# Patient Record
Sex: MALE | Race: WHITE
[De-identification: names, ages, dates, MRNs, and addresses within clinical notes are randomized per-mention and may not be internally consistent; named-entity substitution may affect disease eponyms.]

---

## 2021-10-15 ENCOUNTER — HOSPITAL ENCOUNTER (OUTPATIENT)
Dept: HOSPITAL 46 - ED | Age: 31
Setting detail: OBSERVATION
LOS: 1 days | Discharge: HOME | End: 2021-10-16
Attending: SURGERY | Admitting: SURGERY
Payer: SELF-PAY

## 2021-10-15 VITALS — BODY MASS INDEX: 34.69 KG/M2 | HEIGHT: 66 IN | WEIGHT: 215.83 LBS

## 2021-10-15 DIAGNOSIS — Z20.822: ICD-10-CM

## 2021-10-15 DIAGNOSIS — K35.80: Primary | ICD-10-CM

## 2021-10-15 DIAGNOSIS — F17.210: ICD-10-CM

## 2021-10-15 PROCEDURE — C9803 HOPD COVID-19 SPEC COLLECT: HCPCS

## 2021-10-15 PROCEDURE — U0003 INFECTIOUS AGENT DETECTION BY NUCLEIC ACID (DNA OR RNA); SEVERE ACUTE RESPIRATORY SYNDROME CORONAVIRUS 2 (SARS-COV-2) (CORONAVIRUS DISEASE [COVID-19]), AMPLIFIED PROBE TECHNIQUE, MAKING USE OF HIGH THROUGHPUT TECHNOLOGIES AS DESCRIBED BY CMS-2020-01-R: HCPCS

## 2021-10-15 PROCEDURE — C9113 INJ PANTOPRAZOLE SODIUM, VIA: HCPCS

## 2021-10-15 NOTE — NUR
PT ARRIVED TO [facility] FLOOR AT 2305, PT MOVED OVER TO BED SBA. URINAL AT
BEDSIDE AND ORIENTED PT ON HOW TO USE CALL LIGHT. COMPLETED ADMISSION HX. PT
STATES HE HAD A HARD TIME WAKING FROM ANESTHESIA AS A CHILD FOR AN UNKNOWN
SURGERY. PT REPORTS PAIN 7/10 STATING THE LAST DOSE DIDN'T WORK. TALKED WITH
PRIMARY RN TANYA ABOUT GETTING HIM MORE MORPHINE. CALL LIGHT IS CLSOE AND
PT DENIES NEEDS.

## 2021-10-16 PROCEDURE — 0DTJ4ZZ RESECTION OF APPENDIX, PERCUTANEOUS ENDOSCOPIC APPROACH: ICD-10-PCS | Performed by: SURGERY

## 2021-10-16 NOTE — NUR
REPORT RECEIVED FROM BREANA MARTÍNEZ. PT RESTING IN BED PLAYING ON PHONE. PT
REPORTS 5/10 PAIN IN LOWER ABDOMEN, REPORTS PAIN IS TOLERABLE AND DENIES NEED
FOR PAIN MEDICAITON AT THIS TIME. PUMP ALARMING, "INFUSION COMPLETE"
NEW IV FLUIDS HUNG. PT DENIES ADDITIONAL REQUESTS OR COMPLAINTS AT THIS TIME
STATING HE WOULD LIKE TO REST AS HE DID NOT SLEEP LAST NIGHT. CALL LIGHT
WITHIN REACH. BED RAILS UP.

## 2021-10-16 NOTE — CONS
[facility]
                                    [address], [place]  [address]
_________________________________________________________________________________________
                                                                 Signed   
 
 
DATE OF CONSULTATION:
10/16/2021
 
CHIEF COMPLAINT:
Right lower quadrant abdominal pain.
 
HISTORY OF PRESENT ILLNESS:
Rojelio is a 31-year-old gentleman, otherwise healthy, who developed right lower
quadrant abdominal pain around lunchtime yesterday.  He vomited a couple of times.  He
came to the emergency room for evaluation.  Vital signs were fine.  He was tender in the
right lower quadrant with an elevated white blood cell count.  CT scan confirmed his
fluid-filled appendix with some mild periappendiceal inflammation.  I was asked to admit
him overnight as a general surgeon on-call.  He has received his Rocephin and Flagyl.
He has been hydrated.  He said this morning, he feels about the same, but does feel
better with IV fluids. 
 
PAST MEDICAL HISTORY:
None.
 
PAST SURGICAL HISTORY:
Circumcision, age 8.
 
SOCIAL HISTORY:
He smokes a pack of cigarettes a day.  He does have a drink once in a while.  He has a
girlfriend and one daughter.  His mother is Kate Baer at [phone].  He has no
primary care provider.  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
and so forth. 
 
FAMILY HISTORY:
None.
 
REVIEW OF SYSTEMS:
He said he was slow to wake up after his circumcision.
 
ALLERGIES:
None.
 
MEDICATIONS:
None.
 
PHYSICAL EXAMINATION:
VITAL SIGNS:  His blood pressure is 112/46, heart rate 67, his respiratory rate 16,
temperature is 98.0.  He is 99% on room air.  He is 5 feet 6 inches at 97 kg. 
GENERAL:  Rojelio is a 31-year-old gentleman, who appears healthy and at his stated
 
    Electronically Signed By: CINDY INGRAM MD  10/16/21 1040
_________________________________________________________________________________________
PATIENT NAME:     ROJELIO CROUCH                       
MEDICAL RECORD #: [id number]            CONSULTATION                  
          ACCT #: [id number]  
DATE OF BIRTH:   [birth year]            REPORT #: [id number]      
PHYSICIAN:        CINDY INGRAM MD             
PCP:              NO PRIMARY CARE PHYSICIAN     
REPORT IS CONFIDENTIAL AND NOT TO BE RELEASED WITHOUT AUTHORIZATION
 
 
                                  [facility]
                                    [address], [place]  [address]
_________________________________________________________________________________________
                                                                 Signed   
 
 
age.  He does not appear systemically ill or toxic. 
LUNGS:  Clear to auscultation bilaterally.   
HEART:  Regular rate and rhythm without murmurs.   
ABDOMEN:  Soft and flat, but he is tender just lateral to McBurney's point in the right
lower quadrant and up towards the right middle quadrant. 
RECTAL:  Exam is not performed today.
 
LABORATORY DATA:
His white blood count is 15.9, hemoglobin 14, and neutrophils 83.  Electrolytes
unremarkable.  COVID is negative.  Liver function tests are negative.  His albumin is
4.3. 
 
RADIOGRAPHIC STUDIES:
The CT scan of the abdomen and pelvis are reviewed along with the report.  One can
easily see his fluid-filled appendix with some mild periappendiceal inflammation
starting at the cecum traveling cephalad up along the right gutter. 
 
ASSESSMENT AND PLAN:
Rojelio is a 31-year-old gentleman, who presents with acute appendicitis.  He has been
admitted, started on IV fluids, antibiotics and pain control.  I reviewed with him the
above findings.  We reviewed the location of function of the appendix.  We have reviewed
laparoscopic versus open appendectomy he understands expected intraop and postop course.
 There is risk including, but not limited to bleeding, infection, scarring, change in
contour of the skin, damage to bowel, appendiceal stump leak, postoperative
intra-abdominal abscess, incisional hernias and other unforeseen comorbidities.  He has
expressed understanding would like to proceed. 
 
 
 
            ________________________________________
            Cindy Ingram MD 
 
 
ALB/MODL
Job #:  [id number]
DD:  10/16/2021 08:06:29
DT:  10/16/2021 08:21:24
 
 
Copies:                                
~
 
 
 
 
    Electronically Signed By: CINDY INGRAM MD  10/16/21 1040
_________________________________________________________________________________________
PATIENT NAME:     ROJELIO CROUCH                       
MEDICAL RECORD #: [id number]            CONSULTATION                  
          ACCT #: [id number]  
DATE OF BIRTH:   [birth year]            REPORT #: [id number]      
PHYSICIAN:        CINDY INGRAM MD             
PCP:              NO PRIMARY CARE PHYSICIAN     
REPORT IS CONFIDENTIAL AND NOT TO BE RELEASED WITHOUT AUTHORIZATION

## 2021-10-16 NOTE — NUR
pt AWAKE AND RESTING IN BED, EYES OPEN. RR EVEN AND UNLABORED. NO DISTRESS OR
NEEDS VERBALIZED. pt DENIES NEED FOR PAIN MEDICATION. CALL LIGHT IN REACH.

## 2021-10-16 NOTE — OR
[facility]
                                    [address], [place]  [address]
_________________________________________________________________________________________
                                                                 Signed   
 
 
DATE OF OPERATION:
10/16/2021
 
SURGEON:
Cindy Ingram MD
 
PREOPERATIVE DIAGNOSIS:
Acute appendicitis.
 
POSTOPERATIVE DIAGNOSIS:
Acute inflamed appendicitis.
 
PROCEDURE:
Laparoscopic appendectomy.
 
ESTIMATED BLOOD LOSS:
None.
 
FINDINGS:
Rojelio indeed had acute inflamed appendicitis.  He had a few adhesions of the terminal
ileum into the cecum to the right lateral abdominal wall.  A few of those adhesions on
the terminal ileum were taken down in order to access the base of the appendix. 
 
INDICATIONS:
Rojelio is a 31-year-old gentleman, otherwise quite healthy.  Yesterday, he developed
right lower quadrant abdominal pain with vomiting.  He came to the emergency room for
evaluation.  He was tender in the right lower quadrant.  His white count was elevated.
CT scan confirmed his fluid-filled appendix with some mild periappendiceal inflammation.
 I have been asked to admit him last night as a general surgeon on-call.  He has been
hydrated overnight and started on his antibiotics and pain control.  I met with Rojelio
this morning and I reviewed the above findings with him.  We discussed the location and
function of the appendix.  We discussed laparoscopic versus open appendectomy.  We
reviewed the expected intraop and postop course.  There is risk including, but not
limited to bleeding, infection, scarring, change in contour of the skin, damage to
bowel, appendiceal stump leak, postoperative intraabdominal abscess, incisional hernias
and other unforeseen comorbidities.  He had expressed understanding and wished to
proceed. 
 
PROCEDURE IN DETAIL:
Rojelio was taken into the operating room and placed in the supine position under
general endotracheal tube anesthesia.  He was already on preoperative antibiotics.  He
was given subcutaneous Lovenox.  SCDs were utilized.  A Fitzgerald catheter was inserted with
 
    Electronically Signed By: CINDY INGRAM MD  10/16/21 1814
_________________________________________________________________________________________
PATIENT NAME:     ROJELIO CROUCH                       
MEDICAL RECORD #: [id number]            OPERATIVE REPORT              
          ACCT #: [id number]  
DATE OF BIRTH:   [birth year]            REPORT #: [id number]      
PHYSICIAN:        CINDY INGRAM MD             
PCP:              NO PRIMARY CARE PHYSICIAN     
REPORT IS CONFIDENTIAL AND NOT TO BE RELEASED WITHOUT AUTHORIZATION
 
 
                                  [facility]
                                    [address], [place]  [address]
_________________________________________________________________________________________
                                                                 Signed   
 
 
return of clear yellow urine.  He was then prepped and draped in the usual sterile
fashion.  We placed our trocars in our usual positions under direct visualization of
camera without difficulty.  He had just a few adhesions of the terminal ileum and the
cecum to the right lower quadrant abdominal wall.  We took down just a few adhesions
along the terminal ileum, so we could move the terminal ileum out of the way and access
the base of the appendix.  The base of the appendix was cleared off with cautery and the
mesoappendix was divided with the help of the linear stapler.  We then divided the base
of the appendix from the cecum with a linear stapler.  Hemostasis was excellent in both
staple lines.  The appendix was then placed into an EndoCatch bag and taken out through
the right subcostal trocar site.  We then used our laparoscopic suturing device to pass
0-Vicryl suture x2 on either side of the fascia of the right subcostal trocar site.
This suture was tied down to close this fascia primarily.  After this, all the gas was
allowed to escape and all the trocars were then removed.  We then closed the fascia of
the supraumbilical trocar site with interrupted figure-of-eight and simple 0-Vicryl
sutures.  Local anesthetic was injected into all trocar sites.  Each trocar site was
irrigated and suctioned out until clear.  The dermis of each trocar site was closed with
interrupted 3-0 subcuticular Monocryl sutures.  We used a 5-0 fast absorbing plain gut
suture to close the skin on all three trocar sites.  Dry gauze and tape were applied to
all three incisions.  Rojelio's Fitzgerald catheter was removed without difficulty.  He was
awakened from his anesthesia, extubated in the OR, and taken to recovery room in stable
condition. 
 
 
 
            ________________________________________
            Cindy Ingram MD 
 
 
ALB/MODL
Job #:  [id number]
DD:  10/16/2021 10:35:03
DT:  10/16/2021 17:09:16
 
cc:            Cindy Ingram MD
 
 
Copies:  CINDY INGRAM MD
~
 
 
 
 
 
    Electronically Signed By: CINDY INGRAM MD  10/16/21 1814
_________________________________________________________________________________________
PATIENT NAME:     ROJELIO CROUCH                       
MEDICAL RECORD #: [id number]            OPERATIVE REPORT              
          ACCT #: [id number]  
DATE OF BIRTH:   [birth year]            REPORT #: [id number]      
PHYSICIAN:        CINDY INGRAM MD             
PCP:              NO PRIMARY CARE PHYSICIAN     
REPORT IS CONFIDENTIAL AND NOT TO BE RELEASED WITHOUT AUTHORIZATION

## 2021-10-16 NOTE — NUR
PT AWAKE IN BED. UPDATED WHITE BOARD AND OPENED BLINDS. PT ACCEPTED WARM CLOTH
FOR FACE. CALL LIGHT WITHIN REACH. NO FURTHER NEEDS AT THIS TIME.

## 2021-10-16 NOTE — NUR
VITALS AND ASSESSMENT DUE. THIS RN TO ROOM. VITAL SIGNS STABLE. ABDOMINAL
DRESSINGS C/D/I WITH NO DRAINGE NOTED. STAND BY ASSIST UP TO URINATE. PT VOIDS
400ML CLEAR YELLOW URINE, PT CONTINUES TO REPORT BURNING SENSATION WITH
URAINATION. RESOLVEDS ONCE PT IS FINISHED URINATING. STAND BY ASSIST BACK TO
CHAIR. PT DRESSES SELF, NO ASSISTANCE NEEDED. PT REPORTS 3/10 PAIN AT SURGICAL
SITES, DENIES NEED FOR ADDITIONAL PAIN MEDICATION. PT DENIES NAUSEA. PT
REPORTS HE IS READY FOR DISCHARGE. PT TOLERATING PO FOOD AND FLUID, PAIN
CONTROLED, AMBULATING, AND VOIDING QUANTITY SUFFICIENT. DISCHARGE INSTRUCTIONS
REVIEWED WITH PT AND PTS FATHER. PT AND FATHER VERBALIZE UNDERSTANDING OF
MEDICATIONS, FOLLOW UP, ACTIVITY RESTRICTIONS, AND WORK RESTRICTIONS AND
REPORT THEIR QUESTIONS HAVE BEEN ANSWERED. PHARAMACIST TO BEDSIDE TO REVIEW
MEDISTIONS WITH PT AND FATHER. PT REPORTS HE HAS NO ADDITIONAL CONCERNS AT
THIS TIME. PT WHEELED FROM MED/SURG WITH ALL BELONGINGS.

## 2021-10-16 NOTE — NUR
MORNING ASSESSMENT AND MEDICATION DUE. PT READY FOR TRANSFER TO OR. PT REPORTS
5/10 PAIN IN LOWER RIGHT QUADRANT, DENIES NEED FOR PAIN MEDICATION AT THIS
TIME. ABDOMEN TENDER TO TOUCH. PT GUARDS ABDOMEN, ESPICIALLY LOWER QUADRANT
AND RIGHT SIDED OF ABDOMEN. PT DENIES NAUSEA. CHG WIPE DOWN PERFORED WITH
ASSISTANCE FROM CNA. PRE PROCEEDURE CHECK LIST COMPLETE. LUNG SOUNDS CLEAR.
HEART TONES REGULAR. MEDICATIONS GIVEN. ENOXAPARIN NOT YET AVALIBLE IN [place],
OR RNSOUMYA, STATES HE WILL GIVE THIS MEDICATION IN OR. METRONIDAZOLE GIVEN
AND CEFTRIAXONE GIVEN TO OR RNSOUMYA, TO START IN OR PRIOR TO CUT TIME. SCD'S
IN PLACE. PT VERBLIAZES UNDERSTANDING OF PROCEEDURE AND STATES HIS QUESTIONS
HAVE BEEN ANSWERED. IV ASSESSED, WNL NO S/S PHLEBITIS NOTED, BRISK BLOOD
RETURN NOTED. LR ON STRIGHT TUBING HUNG FOR USE IN OR. PT TRANSFERS SELF TO OR
STRETCHER, STEADY ON FEET. NO ADDITIONAL REQUESTS OR COMPLAINTS. PT TO OR WITH
OR NURSES, REPORT GIVEN TO SOUMYA OR BREANA.

## 2021-10-16 NOTE — NUR
VS AND I&O'S COLLECTED AND STABLE. pt AWOKE TO VOICE, DENIES NEED FOR PAIN
MEDICATION AT THIS TIME, WILL CONTINUE TO MONITOR. IV SITE WNL, FLUIDS
INFUSING AS DIRECTED. URINAL REMAINS AT BEDSIDE, NO FURTHER NEEDS. CALL LIGHT
IN REACH.

## 2021-10-16 NOTE — NUR
PT STEADY ON FEET. PT IN CHAIR. THIS CNA ALONG WITH BREANA MCLEOD DID POST-OP
VITALS. PT ACCEPTED ICE WATER AND JELLO. THIS CNA DID A LINEN CHANGE BEFORE PT
ARRIVED BACK FROM SURGERY.

## 2021-10-16 NOTE — NUR
IN ROOM TO COMPLETE pt ASSESSMENT, pt RESTING IN BED WITH EYES CLOSED. RR
EVEN AND UNLABORED, NO DISTRESS NOTED. pt
AWOKE TO VOICE, DID NOT RATE PAIN BUT DID NOT VERBALIZE NEED FOR ADDITIONAL
PAIN MEDICATION AT THIS TIME. pt INSTRUCTED TO CALL RN STAFF IF PAIN WORSENS
AND PAIN MEDICATION IS NEEDED, pt VERBALIZED UNDERSTANDING. pt NPO, IV FLUIDS
INFUSING AS DIRECTED. IV SITE WNL. NO FURTHER NEEDS, CALL LIGHT IN REACH.

## 2021-10-16 NOTE — NUR
VITALS AND ASSESSMENT DUE. PT REMAINS UP TO CHAIR. PT REPORTS PAIN IS
INCREASING NOW 5/10 ACHING PAIN IN RIGHT ABDOMEN. PT AGREES TO TRY A PAIN
PILL. CRACKERS GIVEN AND PT TOELRATES PO INTAKE WELL. PT ADVANCED TO REGULAR
DIET. LUNCH ORDER PLACED. PO PAIN PILL GIVEN (SEE MAR). PT CONTINTINUES TO
STATE HE WOULD LIKE TO GO HOME. PT ENCOUGED TO EAT LUNCH FIRST TO ENSURE HE
DOES NOT BECOME NAUSEOUS. PT AGREES. LAP SITES TO ABDOMEN REMAIN C/D/I, GAUZE
AND TAPE INTACT, NO DRAINAGE NOTED. PT DENIES ADDITIONAL REQUESTS OR CONCERNS.
CALL LIGHT WITHIN REACH. VITALS SIGNS STABLE.

## 2021-10-16 NOTE — NUR
10/16/21 1043 Sheets,[place]
1030 PT ARRIVED TO PACU WITH ORAL AIRWAY IN PLACE. VSS, PT STARTS
MOVING IN BED AND BITING DOWN ON THE AIRWAY. RN REORIENTING PT TO PACU
 
1031 AIRWAY REMOVED WHEN PT FOLLOW COMMANDS TO OPEN HIS MOUTH.
 
1035 PT OPENING HIS EYES AND PT PULLED OFF O2 MASK. PT REPORTS
TOLERABLE AMOUNT OF PAIN 3/10.

## 2021-10-16 NOTE — NUR
PATIENT VITALS AND IS AND OS CHARTED. ROOM TIDIED, CALL LIGHT LEFT WITHIN
REACH, NO OTHER IMMEDIATE NEEDS AT THIS TIME.

## 2021-10-16 NOTE — NUR
PT ARRIVED FROM PACU. REPORT RECEIVED FROM BREANA DYER. PT EVERNGETIC AND JUMPS
UP OUT OF BED. PT REPORTS "I FEEL FINE." PT WANTS TO USE THE REST ROOM. PT
ENCORUAGED TO TAKE SLOW MOVEMENT. PT SITS ON EDGE OF BED INDEPENDANLTY, TAKES
MARCHING STEPS IN PLACE, STEADY ON FEET. PT AMBULATES TO RESTROOM. VOIDS 50ML
CLEAR YELLOW URINE. REPORTS BURING WITH URINATION, NOTED THAT PT HAS LANTIGUA
CATHETER WHILE IN OR. PT AMBULATES UP TO CHAIR WITH STAND BY ASSIST. REPORTS
HE DOES NOT WANT TO LYE IN BED. PT REPORTS 4/10 ACHING PAIN IN RIGHT LOWER
QUADRANT, PT DENIES NEED FOR PAIN MEDICATION AT THIS TIME. ICE PACK PROVIDED
PER MD ORDER. IV ASSSESSED, WNL. BRISK BLOOD RETURN NOTED. IV FLUIDS STARTED
(SEE MAR). ABDOMINAL LAP SITES X3 NOTED, COVERD WITH GAUZE AND METAPORE TAPE,
C/D/I AT THIS TIME, NO DRAINAGE NOTED. EDUCATION DONE WITH PT REGARDING WOUND
CARE AND INFECTION PREVENTION. PT REMAINS UP TO CHAIR. NO ADDITIONAL REQUESTS
OR COMPLAINTS. TOLERATING JELLO INTAKE, WATER PROVIDED, NO NAUSEA AT THIS
TIME. PT WATCHIGN TV. CALL LIGHT WITHIN REACH.

## 2022-05-21 ENCOUNTER — HOSPITAL ENCOUNTER (EMERGENCY)
Dept: HOSPITAL 46 - ED | Age: 32
Discharge: HOME | End: 2022-05-21
Payer: SELF-PAY

## 2022-05-21 VITALS — HEIGHT: 66 IN | WEIGHT: 215.19 LBS | BODY MASS INDEX: 34.58 KG/M2

## 2022-05-21 DIAGNOSIS — S70.211A: Primary | ICD-10-CM
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
## 2022-05-21 DIAGNOSIS — Z79.899: ICD-10-CM

## 2022-05-21 DIAGNOSIS — S80.812A: ICD-10-CM

## 2022-05-21 PROCEDURE — A9270 NON-COVERED ITEM OR SERVICE: HCPCS
